# Patient Record
Sex: MALE | Race: WHITE | Employment: OTHER | URBAN - METROPOLITAN AREA
[De-identification: names, ages, dates, MRNs, and addresses within clinical notes are randomized per-mention and may not be internally consistent; named-entity substitution may affect disease eponyms.]

---

## 2017-02-10 ENCOUNTER — OFFICE VISIT (OUTPATIENT)
Dept: CARDIOLOGY CLINIC | Age: 37
End: 2017-02-10

## 2017-02-10 VITALS
RESPIRATION RATE: 16 BRPM | SYSTOLIC BLOOD PRESSURE: 122 MMHG | HEIGHT: 73 IN | DIASTOLIC BLOOD PRESSURE: 84 MMHG | WEIGHT: 213.4 LBS | OXYGEN SATURATION: 98 % | BODY MASS INDEX: 28.28 KG/M2 | HEART RATE: 68 BPM

## 2017-02-10 DIAGNOSIS — I47.1 SVT (SUPRAVENTRICULAR TACHYCARDIA) (HCC): Primary | ICD-10-CM

## 2017-02-10 DIAGNOSIS — R00.2 PALPITATIONS: ICD-10-CM

## 2017-02-10 RX ORDER — ACAMPROSATE CALCIUM 333 MG/1
TABLET, DELAYED RELEASE ORAL
Refills: 3 | COMMUNITY
Start: 2016-12-20

## 2017-02-10 RX ORDER — ESCITALOPRAM OXALATE 10 MG/1
TABLET ORAL
Refills: 0 | COMMUNITY
Start: 2017-01-17

## 2017-02-10 NOTE — PROGRESS NOTES
HISTORY OF PRESENTING ILLNESS      Sukhwinder Owens is a 39 y.o. male with SVT who underwent ORT ablation of a right inferoseptal accessory pathway ablation and now presents for follow up post-procedure. ACTIVE PROBLEM LIST     Patient Active Problem List    Diagnosis Date Noted    SVT (supraventricular tachycardia) 12/20/2016    Palpitations 12/07/2016           PAST MEDICAL HISTORY     No past medical history on file. PAST SURGICAL HISTORY     Past Surgical History   Procedure Laterality Date    Hx wisdom teeth extraction Bilateral     Hx rotator cuff repair Bilateral           ALLERGIES     No Known Allergies       FAMILY HISTORY     Family History   Problem Relation Age of Onset    Schizophrenia Mother     negative for cardiac disease       SOCIAL HISTORY     Social History     Social History    Marital status:      Spouse name: N/A    Number of children: N/A    Years of education: N/A     Social History Main Topics    Smoking status: Current Every Day Smoker     Packs/day: 0.50    Smokeless tobacco: Not on file    Alcohol use 0.6 oz/week     1 Cans of beer per week      Comment: EVERY OTHER DAY    Drug use: Yes     Special: Marijuana      Comment: DAILY    Sexual activity: Not on file     Other Topics Concern    Not on file     Social History Narrative         MEDICATIONS     No current outpatient prescriptions on file. No current facility-administered medications for this visit. I have reviewed the nurses notes, vitals, problem list, allergy list, medical history, family, social history and medications. REVIEW OF SYMPTOMS      General: Pt denies excessive weight gain or loss. Pt is able to conduct ADL's  HEENT: Denies blurred vision, headaches, hearing loss, epistaxis and difficulty swallowing. Respiratory: Denies cough, congestion, shortness of breath, VALDIVIA, wheezing or stridor.   Cardiovascular: Denies precordial pain, palpitations, edema or PND  Gastrointestinal: Denies poor appetite, indigestion, abdominal pain or blood in stool  Genitourinary: Denies hematuria, dysuria, increased urinary frequency  Musculoskeletal: Denies joint pain or swelling from muscles or joints  Neurologic: Denies tremor, paresthesias, headache, or sensory motor disturbance  Psychiatric: Denies confusion, insomnia, depression  Integumentray: Denies rash, itching or ulcers. Hematologic: Denies easy bruising, bleeding     PHYSICAL EXAMINATION      There were no vitals filed for this visit. General: Well developed, in no acute distress. HEENT: No jaundice, oral mucosa moist, no oral ulcers  Neck: Supple, no stiffness, no lymphadenopathy, supple  Heart:  Normal S1/S2 negative S3 or S4. Regular, no murmur, gallop or rub, no jugular venous distention  Respiratory: Clear bilaterally x 4, no wheezing or rales  Abdomen:   Soft, non-tender, bowel sounds are active.   Extremities:  No edema, normal cap refill, no cyanosis. Musculoskeletal: No clubbing, no deformities  Neuro: A&Ox3, speech clear, gait stable, cooperative, no focal neurologic deficits  Skin: Skin color is normal. No rashes or lesions.  Non diaphoretic, moist.  Vascular: 2+ pulses symmetric in all extremities       DIAGNOSTIC DATA      EKG: Sinus rhythm without ventricular preexcitation       LABORATORY DATA      Lab Results   Component Value Date/Time    WBC 7.9 12/21/2016 01:08 PM    HGB 15.3 12/21/2016 01:08 PM    HCT 43.4 12/21/2016 01:08 PM    PLATELET 894 23/87/8394 01:08 PM    MCV 90 12/21/2016 01:08 PM      Lab Results   Component Value Date/Time    Sodium 140 12/21/2016 01:08 PM    Potassium 4.5 12/21/2016 01:08 PM    Chloride 99 12/21/2016 01:08 PM    CO2 28 12/21/2016 01:08 PM    Glucose 116 12/21/2016 01:08 PM    BUN 9 12/21/2016 01:08 PM    Creatinine 1.07 12/21/2016 01:08 PM    BUN/Creatinine ratio 8 12/21/2016 01:08 PM    GFR est  12/21/2016 01:08 PM    GFR est non-AA 88 12/21/2016 01:08 PM    Calcium 9.7 12/21/2016 01:08 PM           ASSESSMENT      1. Supraventricular tachycardia   A. ORT - right inferoseptal accessory pathway ablation 12/30/16          PLAN     No symptomatic recurrence and patient doing well. Continue clinical observation. FOLLOW-UP     PRN    Thank you,  Ame Anderson MD and Dr. Mouna Ortiz for involving me in the care of this extraordinarily pleasant male. Please do not hesitate to contact me for further questions/concerns.          Jody Montoya MD  Cardiac Electrophysiology / Cardiology    [address]  [address] [place] [address], [address]  [phone] / [phone] Fax   [phone] / [phone] Fax

## 2017-02-10 NOTE — PROGRESS NOTES
Visit Vitals    /84 (BP 1 Location: Left arm, BP Patient Position: Sitting)    Pulse 68    Resp 16    Ht 6' 1\" (1.854 m)    Wt 213 lb 6.4 oz (96.8 kg)    SpO2 98%    BMI 28.15 kg/m2

## 2019-08-01 ENCOUNTER — ED HISTORICAL/CONVERTED ENCOUNTER (OUTPATIENT)
Dept: OTHER | Age: 39
End: 2019-08-01

## 2019-08-08 ENCOUNTER — OP HISTORICAL/CONVERTED ENCOUNTER (OUTPATIENT)
Dept: OTHER | Age: 39
End: 2019-08-08